# Patient Record
Sex: FEMALE | Race: WHITE | NOT HISPANIC OR LATINO | Employment: UNEMPLOYED | URBAN - METROPOLITAN AREA
[De-identification: names, ages, dates, MRNs, and addresses within clinical notes are randomized per-mention and may not be internally consistent; named-entity substitution may affect disease eponyms.]

---
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
## 2024-01-12 PROBLEM — F32.A MILD DEPRESSION: Status: ACTIVE | Noted: 2024-01-12

## 2024-02-27 PROBLEM — R53.83 FATIGUE: Chronic | Status: ACTIVE | Noted: 2018-09-11

## 2024-02-27 PROBLEM — R42 DIZZINESS: Chronic | Status: ACTIVE | Noted: 2018-09-11
